# Patient Record
Sex: FEMALE | Race: BLACK OR AFRICAN AMERICAN | NOT HISPANIC OR LATINO | URBAN - METROPOLITAN AREA
[De-identification: names, ages, dates, MRNs, and addresses within clinical notes are randomized per-mention and may not be internally consistent; named-entity substitution may affect disease eponyms.]

---

## 2024-09-10 ENCOUNTER — OFFICE VISIT (OUTPATIENT)
Dept: URBAN - METROPOLITAN AREA CLINIC 78 | Facility: CLINIC | Age: 69
End: 2024-09-10
Payer: MEDICARE

## 2024-09-10 ENCOUNTER — DASHBOARD ENCOUNTERS (OUTPATIENT)
Age: 69
End: 2024-09-10

## 2024-09-10 VITALS
HEIGHT: 66 IN | BODY MASS INDEX: 31.34 KG/M2 | HEART RATE: 69 BPM | WEIGHT: 195 LBS | TEMPERATURE: 97.9 F | SYSTOLIC BLOOD PRESSURE: 137 MMHG | DIASTOLIC BLOOD PRESSURE: 83 MMHG

## 2024-09-10 DIAGNOSIS — Z12.11 SCREENING FOR COLON CANCER: ICD-10-CM

## 2024-09-10 DIAGNOSIS — Z86.718 HISTORY OF DVT (DEEP VEIN THROMBOSIS): ICD-10-CM

## 2024-09-10 DIAGNOSIS — R94.31 ABNORMAL EKG: ICD-10-CM

## 2024-09-10 DIAGNOSIS — Z85.51 HISTORY OF BLADDER CANCER: ICD-10-CM

## 2024-09-10 PROBLEM — 161508001: Status: ACTIVE | Noted: 2024-09-10

## 2024-09-10 PROBLEM — 711150003: Status: ACTIVE | Noted: 2024-09-10

## 2024-09-10 PROBLEM — 102594003: Status: ACTIVE | Noted: 2024-09-10

## 2024-09-10 PROBLEM — 266987004: Status: ACTIVE | Noted: 2024-09-10

## 2024-09-10 PROCEDURE — 99204 OFFICE O/P NEW MOD 45 MIN: CPT

## 2024-09-10 RX ORDER — ATORVASTATIN CALCIUM, FILM COATED 20 MG/1
TAKE ONE TABLET BY MOUTH ONE TIME DAILY TABLET ORAL
Qty: 30 UNSPECIFIED | Refills: 0 | Status: ACTIVE | COMMUNITY

## 2024-09-10 RX ORDER — VALSARTAN 160 MG/1
1 TABLET TABLET, FILM COATED ORAL ONCE A DAY
Status: ACTIVE | COMMUNITY

## 2024-09-10 RX ORDER — RIVAROXABAN 10 MG/1
1 TABLET TABLET, FILM COATED ORAL ONCE A DAY
Status: ACTIVE | COMMUNITY

## 2024-09-10 NOTE — PHYSICAL EXAM GASTROINTESTINAL
Abdomen,  soft, nontender, nondistended,  normal bowel sounds, She has RLQ scar from prior abdominal surgery in 2011 for tx of hydronephrosis of her R kidney.

## 2024-09-10 NOTE — HPI-TODAY'S VISIT:
69-year-old female, new patient, presents for screening colonoscopy.  She was diagnosed in May with bladder chancer, tx with chemo with radiation. She is currently in remission.  She is due to have a follow up with Urologist on Sept 24 with RENE Smith and an appointement  with Oncologist is  in October. 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 After treatment was placed on xaarelto to continue treatment for the blood clots.  She recently visit her PCP who had advised her to have a colonoscopy for preventative screening.   -- She denies N,V, GERD or dysphagia, abdominal pain, she does have BM QD, no blood or mucus in the stool. She only lost weight during tx for bladder cancer, but is gaining it back slowly.  Apetite is good.    Prior EGD: none  Prior Colonscopy: none and no prior Cologuard Family Hx: none CP/SOB: none Recent Cardiology or Pulmonology Eval: had an irregular EKG last week, and is scheduled to see a Cardiologist, Dr. Tej Wooten, on 9/19. She has not seen a pulmonolgist recently. Use of BT or NSAID or GLP-1: She is on Xarelto rx by a doctor in [place]. Patient will give us their contact information at a later time.  She only uses tylenol prn, not NSAID, and denies use of  weightloss medications.